# Patient Record
Sex: MALE | Race: WHITE | Employment: FULL TIME | URBAN - METROPOLITAN AREA
[De-identification: names, ages, dates, MRNs, and addresses within clinical notes are randomized per-mention and may not be internally consistent; named-entity substitution may affect disease eponyms.]

---

## 2019-05-03 ENCOUNTER — APPOINTMENT (OUTPATIENT)
Dept: GENERAL RADIOLOGY | Age: 54
End: 2019-05-03
Payer: COMMERCIAL

## 2019-05-03 ENCOUNTER — HOSPITAL ENCOUNTER (EMERGENCY)
Age: 54
Discharge: HOME | End: 2019-05-03
Payer: COMMERCIAL

## 2019-05-03 VITALS
DIASTOLIC BLOOD PRESSURE: 85 MMHG | RESPIRATION RATE: 16 BRPM | TEMPERATURE: 98 F | SYSTOLIC BLOOD PRESSURE: 154 MMHG | OXYGEN SATURATION: 100 % | WEIGHT: 170 LBS | HEART RATE: 73 BPM

## 2019-05-03 DIAGNOSIS — S39.012A STRAIN OF LUMBAR REGION, INITIAL ENCOUNTER: Primary | ICD-10-CM

## 2019-05-03 PROCEDURE — 72110 X-RAY EXAM L-2 SPINE 4/>VWS: CPT

## 2019-05-03 PROCEDURE — 99212 OFFICE O/P EST SF 10 MIN: CPT

## 2019-05-03 RX ORDER — TRAMADOL HYDROCHLORIDE 50 MG/1
50 TABLET ORAL EVERY 6 HOURS PRN
Qty: 12 TABLET | Refills: 0 | Status: SHIPPED | OUTPATIENT
Start: 2019-05-03 | End: 2019-05-06

## 2019-05-03 RX ORDER — NAPROXEN 500 MG/1
500 TABLET ORAL 2 TIMES DAILY
Qty: 14 TABLET | Refills: 0 | Status: SHIPPED | OUTPATIENT
Start: 2019-05-03 | End: 2019-05-10

## 2019-05-03 ASSESSMENT — PAIN DESCRIPTION - DESCRIPTORS: DESCRIPTORS: ACHING

## 2019-05-03 ASSESSMENT — PAIN DESCRIPTION - ORIENTATION: ORIENTATION: LOWER

## 2019-05-03 ASSESSMENT — PAIN SCALES - GENERAL: PAINLEVEL_OUTOF10: 5

## 2019-05-03 ASSESSMENT — PAIN DESCRIPTION - LOCATION: LOCATION: BACK

## 2019-05-03 ASSESSMENT — PAIN DESCRIPTION - PAIN TYPE: TYPE: ACUTE PAIN

## 2019-05-03 NOTE — ED PROVIDER NOTES
Department of Emergency Medicine  [address] [place] [address]  Provider Note  Admit Date/Time: 5/3/2019  8:56 AM  Room: 02/02  MRN: [id number]  Chief Complaint: Back Pain (yesterday morning at work he bent over and his low back popped, its just gotten worse, was not lifting anything at the time)       History of Present Illness   Source of history provided by:  patient. History/Exam Limitations: none. Kadi Singh is a 46 y.o. male who has a past medical history of: History reviewed. No pertinent past medical history. presents to the urgent care center by private car for low back pain. The pain is  over the middle of his back he said this happened at work yesterday he said he was wrapping  plastic around a table that they have to pachage an dwhen he stood up something popped  in his back and since that time he has had pain. He said he was having some pain down his legs yesterday but he has not had  pain down his legs today's denying any fever or saddle anesthesia denying any urinary incontinence denying any numbness tingling or weakness in his legs    ROS    Pertinent positives and negatives are stated within HPI, all other systems reviewed and are negative. History reviewed. No pertinent surgical history. Social History:  reports that he has been smoking cigars. He does not have any smokeless tobacco history on file. Family History: family history is not on file. Allergies: Patient has no known allergies. Physical Exam   Oxygen Saturation Interpretation: Normal.   ED Triage Vitals [05/03/19 0857]   BP Temp Temp Source Pulse Resp SpO2 Height Weight   (!) 154/85 98 °F (36.7 °C) Oral 73 16 100 % -- 170 lb (77.1 kg)       Physical Exam  · Constitutional/General: Alert and oriented x3, well appearing, non toxic in NAD  · HEENT:  NC/NT. · Neck: Supple,  · Respiratory:  Not in respiratory distress  · CV:  Regular rate. Regular rhythm. No murmurs, gallops, or rubs.  2+ distal pulses  · Chest: No chest wall tenderness  · GI:  Abdomen Soft, Non tender, Non distended. · Musculoskeletal: Moves all extremities x 4. Warm and well perfused, no clubbing, cyanosis, or edema. Capillary refill <3 seconds  · Integument: skin warm and dry. No rashes. · Lymphatic: no lymphadenopathy noted  · Neurologic: GCS 15, no focal deficits,  · Psychiatric: Normal Affect    Lab / Imaging Results   (All laboratory and radiology results have been personally reviewed by myself)  Labs:  No results found for this visit on 05/03/19. Imaging: All Radiology results interpreted by Radiologist unless otherwise noted. XR LUMBAR SPINE (MIN 4 VIEWS)   Final Result   Multilevel degenerative disc and degenerative joint   disease. ED Course / Medical Decision Making   Medications - No data to display       Consult(s):   None  2  Procedure(s):       MDM:   With pain over the middle of his low back after something popped when he was at work yesterday. I did obtain an x-ray which was negative for any fractures he does have some arthritic changes. He said he is not able to go back to the full duties of his work since he cannot lift without pain. I did down refer him to corporate care for follow-up I did put him on Naprosyn for pain and if that does not relieve his pain I did order some tramadol to take as needed I did search the PDMP and felt was appropriate to provide him with pain medicine. He was released to return to work with restrictions. And he needs to follow-up with corporate care    Counseling:    I have  spoken with the patient and discussed todays results, in addition to providing specific details for the plan of care and counseling regarding the diagnosis and prognosis. Questions are answered at this time and they are agreeable with the plan. Assessment      1.  Strain of lumbar region, initial encounter      Plan   Discharge to home and advised to contact MD Manisha Mejia NE  [facility] [address]  [phone]      As needed    [address] [facility]  [address] [place] [address],[place] [id number]  [phone]    Schedule an appointment as soon as possible for a visit      Patient condition is good    New Medications     New Prescriptions    NAPROXEN (NAPROSYN) 500 MG TABLET    Take 1 tablet by mouth 2 times daily for 7 days PRN/pain    TRAMADOL (ULTRAM) 50 MG TABLET    Take 1 tablet by mouth every 6 hours as needed for Pain for up to 3 days. Intended supply: 3 days. Take lowest dose possible to manage pain     Electronically signed by WINSTON Arreola CNP   DD: 5/3/19  **This report was transcribed using voice recognition software. Every effort was made to ensure accuracy; however, inadvertent computerized transcription errors may be present.   END OF ED PROVIDER NOTE     WINSTON Arreola CNP  05/03/19 3127

## 2019-06-21 ENCOUNTER — EVALUATION (OUTPATIENT)
Dept: PHYSICAL THERAPY | Age: 54
End: 2019-06-21
Payer: COMMERCIAL

## 2019-06-21 DIAGNOSIS — S39.012A STRAIN OF LUMBAR REGION, INITIAL ENCOUNTER: Primary | ICD-10-CM

## 2019-06-21 PROCEDURE — 97163 PT EVAL HIGH COMPLEX 45 MIN: CPT | Performed by: PHYSICAL THERAPIST

## 2019-06-21 NOTE — PROGRESS NOTES
[address] [facility] OUTPATIENT REHABILITATION  PHYSICAL THERAPY INITIAL EVALUATION         Date:  2019   Patient: Kadi Singh  : 1965  MRN: [id number]  Referring Provider: Bethanie Garduno MD  [address] [place] [address]     Medical Diagnosis:   S39.012A (ICD-10-CM) - Strain of muscle, fascia and tendon of lower back, initial encounter    SUBJECTIVE:     Onset date: 19    Onset: Sudden onset    Mechanism of Injury: bending over at work while pulling a roll of tape    Previous PT: none    Medical Management for Current Problem: medications    Chief complaint: pain    Behavior: condition is getting better    Pain: constant  Current: 3/10     Best: 3/10     Worst:3/10    Symptom Type/Quality: dull, throbbing  Location[de-identified] Back: lumbar region center of spine        Provoking Activities/Positions: sitting > 25 min                 Relieving Activitie/Positions: laying down    Disturbed Sleep: no  Bladder Dysfunction: no  Bowel Dysfunction: no     Imaging results: No results found. Past Medical History:  History reviewed. No pertinent past medical history. History reviewed. No pertinent surgical history. Medications:   Current Outpatient Medications   Medication Sig Dispense Refill    naproxen (NAPROSYN) 500 MG tablet Take 1 tablet by mouth 2 times daily for 7 days PRN/pain 14 tablet 0    ibuprofen (ADVIL;MOTRIN) 800 MG tablet Take 1 tablet by mouth every 8 hours as needed for Pain 30 tablet 0     No current facility-administered medications for this visit. 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 Physical demands include: lifting, carrying, pushing, pulling heavy weighted items.   Status: full time    Exercise regimen: walking, gym workouts/weight training , cycling 2-5 days/week    Hobbies: golfing    Patient Goals: pain relief    Contraindications/Precautions: none    OBJECTIVE:     Observations: normal orthopedic exam, well nourished male    Inspection: normal orthopedic exam         Gait: normal    Functional Strength:   Able to toe walk, heel walk, and squat. Range of Motion:    Trunk:    Flexion:  [x] Normal   [] Limited pain upon coming back into neutral   Extension:  [x] Normal   [] Limited  pain   Right Rotation: [x] Normal   [] Limited    Left Rotation:  [x] Normal   [] Limited    Right Side Bending: [x] Normal   [] Limited    Left Side Bending: [x] Normal   [] Limited     Lower Extremity:   Right:   [x] Normal   [] Limited    Left:   [x] Normal   [] Limited       Strength:     Trunk: 4+/5 pain   R LE: 5/5   L LE: 5/5    Palpation: No tenderness     Sensation: intact to light touch and temperature. Special Tests:   [x] Nerve Root Compression           Right []+ / [x] -    Left []+ / [x] -  [x] Slump           Right []+ / [x] -    Left []+ / [x] -  [] FADIR          Right []+ / [] -    Left []+ / [] -  [] S-I Distraction          Right []+ / [] -    Left []+ / [] -     [x] SLR           Right []+ / [x] -    Left []+ / [x] -     [x] SPENCER          Right []+ / [x] -    Left []+ / [] -  [] S-I Compression          Right []+ / [] -    Left []+ / [] -   [] Other: []+ / [] -       Special Test Comments:     ASSESSMENT     Outcome Measure:   Modified Oswestry 14% disability    Problems:    Pain reported 3/10   ROM painful   Strength decreased   Decreased functional ability with lifting and carrying    [x] There are no barriers affecting plan of care or recovery    [] Barriers to this patient's plan of care or recovery include.     Domestic Concerns:  [x] No  [] Yes:    Short Term goals (6 visits)   Decrease reported pain to 1/10   Increase ROM to WNL    Increase Strength to 5-/5    Able to perform/complete the following functions/tasks: lift and sit    Long Term goals (12 visits)   Decrease reported pain to 0/10   Increase Strength to 5/5    Able to perform/complete the following functions/tasks: lift w/o pain or apprehension  Oswestry Low Back Disability Questionnaire 4% disability    Independent with Home Exercise Programs    Rehab Potential: [x] Good  [] Fair  [] Poor    PLAN       Treatment Plan:   [x] Therapeutic Exercise  [x] Therapeutic Activity  [x] Neuromuscular Re-education   [] Gait Training  [] Balance Training  [] Aerobic conditioning  [] Manual Therapy  [] Massage/Fascial release   [] Work/Sport specific activities    [] Pain Neuroscience [x] Cold/hotpack  [] Vasocompression  [x] Electrical Stimulation  [] Lumbar/Cervical Traction  [] Ultrasound   [] Iontophoresis: 4 mg/mL Dexamethasone Sodium Phosphate 40-80 mAmin        [x] Instruction in HEP      []  Medication allergies reviewed for use of Dexamethasone Sodium Phosphate 4mg/ml  with iontophoresis treatments. Patient is not allergic. The following CPT codes are likely to be used in the care of this patient: 70713 Therapeutic Exercise , 35468 Neuromuscular Re-Education , 52767 Therapeutic Activities ,  Electrical Stimulation      Suggested Professional Referral: [x] No  [] Yes:     Patient Education:  [x] Plans/Goals, Risks/Benefits discussed  [x] Home exercise program  Method of Education: [x] Verbal  [x] Demo  [x] Written  Comprehension of Education:  [x] Verbalizes understanding. [x] Demonstrates understanding. [] Needs Review. [] Demonstrates/verbalizes understanding of HEP/Ed previously given. Frequency:  3 days per week for 4 weeks    Patient understands diagnosis/prognosis and consents to treatment, plan and goals: [x] Yes    [] No     Thank you for the opportunity to work with your patient. If you have questions or comments, please contact me at [phone]; fax: [phone]. Electronically signed by: Ludmila Souza PT         Please sign Physician's Certification and return to:   [facility] [address]  Dept: [phone]  Dept Fax: [phone] Certification / Comments     Frequency/Duration 3 days per week for 4 weeks. Certification period from 6/21/2019  to 9-10-19. I have reviewed the Plan of Care established for skilled therapy services and certify that the services are required and that they will be provided while the patient is under my care.     Physician's Comments/Revisions:               Physician's Printed Name:                                           [de-identified] Signature:                                                               Date:

## 2019-06-27 ENCOUNTER — TREATMENT (OUTPATIENT)
Dept: PHYSICAL THERAPY | Age: 54
End: 2019-06-27
Payer: COMMERCIAL

## 2019-06-27 DIAGNOSIS — S39.012A STRAIN OF LUMBAR REGION, INITIAL ENCOUNTER: Primary | ICD-10-CM

## 2019-06-27 PROCEDURE — 97110 THERAPEUTIC EXERCISES: CPT

## 2019-06-27 NOTE — PROGRESS NOTES
Physical Therapy Treatment Note    Date: 2019  Patient Name: Petra Hyde  : 1965   MRN: [id number]  DOInjury: 19  DOSx: none  Referring Provider:  Allen Schmidt MD     Medical Diagnosis: A69.625R (ICD-10-CM) - Strain of muscle, fascia and tendon of lower back, initial encounter  Outcome Measure:  Oswestry 14%    S: feeling pretty good  O:  Time 1965-7854     Visit 3 Repeat outcome measure at mid point and end. Pain 3/10     ROM      Modalities      MH + ES            Exercise      ALL EXERCISE DONE WITH DRAW-IN TECHNIQUE                            Functional activities To aid in reaching , pushing, pulling tasks at home     ROWS: H Blue 2 x 20     ROWS: M Blue 2 x 20     ROWS: L Blue 2 x 20     Obliques - high next Xx\"    Obliques - low next Xx\"    Tubing pushes Blue 2 x 20 xx    Nustep   L5/10 min xx    Punches      Lat pulldowns      Triceps ext standing      Marching            Trunk ext TB      Trunk flex TB      Hip abd      Hip EXT      TG Squats                  A:  Tolerated well. Above added to written HEP.  Gave pt blue tubing  P: Continue with rehab plan  Livia Moran PTA    Treatment Charges: Mins Units   Initial Evaluation     Re-Evaluation     Ther Exercise         TE 30 2   Manual Therapy     MT     Ther Activities        TA     Gait Training          GT     Neuro Re-education NR     Modalities     Non-Billable Service Time     Other     Total Time/Units 30 2

## 2025-04-15 DIAGNOSIS — M25.561 ACUTE PAIN OF RIGHT KNEE: Primary | ICD-10-CM

## 2025-04-22 ENCOUNTER — OFFICE VISIT (OUTPATIENT)
Dept: ORTHOPEDIC SURGERY | Age: 60
End: 2025-04-22
Payer: COMMERCIAL

## 2025-04-22 VITALS — BODY MASS INDEX: 23.62 KG/M2 | WEIGHT: 165 LBS | HEIGHT: 70 IN

## 2025-04-22 DIAGNOSIS — M77.51 TENDONITIS OF ANKLE, RIGHT: ICD-10-CM

## 2025-04-22 DIAGNOSIS — M17.11 PRIMARY OSTEOARTHRITIS OF RIGHT KNEE: Primary | ICD-10-CM

## 2025-04-22 PROCEDURE — 4004F PT TOBACCO SCREEN RCVD TLK: CPT | Performed by: ORTHOPAEDIC SURGERY

## 2025-04-22 PROCEDURE — 3017F COLORECTAL CA SCREEN DOC REV: CPT | Performed by: ORTHOPAEDIC SURGERY

## 2025-04-22 PROCEDURE — 99213 OFFICE O/P EST LOW 20 MIN: CPT | Performed by: ORTHOPAEDIC SURGERY

## 2025-04-22 PROCEDURE — G8420 CALC BMI NORM PARAMETERS: HCPCS | Performed by: ORTHOPAEDIC SURGERY

## 2025-04-22 PROCEDURE — G8427 DOCREV CUR MEDS BY ELIG CLIN: HCPCS | Performed by: ORTHOPAEDIC SURGERY

## 2025-04-22 RX ORDER — AMLODIPINE BESYLATE 2.5 MG/1
TABLET ORAL
COMMUNITY
Start: 2025-02-24

## 2025-04-22 RX ORDER — METHYLPREDNISOLONE 4 MG/1
TABLET ORAL
Qty: 1 KIT | Refills: 0 | Status: SHIPPED | OUTPATIENT
Start: 2025-04-22 | End: 2025-04-28

## 2025-04-22 RX ORDER — PRAVASTATIN SODIUM 40 MG
TABLET ORAL
COMMUNITY
Start: 2024-07-09

## 2025-04-22 RX ORDER — MELOXICAM 15 MG/1
15 TABLET ORAL DAILY
Qty: 90 TABLET | Refills: 1 | Status: SHIPPED | OUTPATIENT
Start: 2025-04-22

## 2025-04-22 RX ORDER — MELOXICAM 15 MG/1
15 TABLET ORAL DAILY
COMMUNITY

## 2025-04-22 NOTE — PROGRESS NOTES
Chief Complaint   Patient presents with    Knee Pain     Right Knee x 2 months, woke up one morning and was not able to bear any weight, seen PCP and was given NSAID's  No previous right knee surgery.         Subjective:     Patient ID: Edward Patel is a 59 y.o..  male    Knee Pain  Patient complains of right knee pain. This is evaluated as a personal injury. There was not a history of injury.  The pain began 2 months ago. He stated he woke up and had trouble weight bearing. The pain is located medial. He describes  His symptoms as aching. He has experienced popping, clicking, locking, and giving way in the affected knee.  The patient has had pain with kneeling, squating, and climbing stairs.  Symptoms improve with rest. The symptoms are worse with activity, stair climbing, kneeling. The knee has not given out or felt unstable. The patient can bend and straighten the knee fully.  The patient is active in none. Treatment to date has been ice, NSAID's, without significant relief. The patient is working. He also has been complaining of pain over the bottom of the right shin to the ankle that started a few weeks ago.    Past Medical History:   Diagnosis Date    Hypercholesteremia     Hypertension      No past surgical history on file.    Current Outpatient Medications:     pravastatin (PRAVACHOL) 40 MG tablet, Take by mouth, Disp: , Rfl:     meloxicam (MOBIC) 15 MG tablet, Take 1 tablet by mouth daily, Disp: , Rfl:     amLODIPine (NORVASC) 2.5 MG tablet, TAKE ONE TABLET BY MOUTH EVERY DAY. DUE FOR AN APPOINTMENT, Disp: , Rfl:   No Known Allergies  Social History     Socioeconomic History    Marital status:      Spouse name: Not on file    Number of children: Not on file    Years of education: Not on file    Highest education level: Not on file   Occupational History    Not on file   Tobacco Use    Smoking status: Light Smoker     Types: Cigars    Smokeless tobacco: Not on file   Substance and Sexual

## 2025-05-13 ENCOUNTER — OFFICE VISIT (OUTPATIENT)
Dept: ORTHOPEDIC SURGERY | Age: 60
End: 2025-05-13

## 2025-05-13 VITALS — WEIGHT: 165 LBS | BODY MASS INDEX: 23.62 KG/M2 | HEIGHT: 70 IN

## 2025-05-13 DIAGNOSIS — M17.12 PRIMARY OSTEOARTHRITIS OF LEFT KNEE: ICD-10-CM

## 2025-05-13 DIAGNOSIS — M17.11 PRIMARY OSTEOARTHRITIS OF RIGHT KNEE: Primary | ICD-10-CM

## 2025-05-13 RX ORDER — TRIAMCINOLONE ACETONIDE 40 MG/ML
40 INJECTION, SUSPENSION INTRA-ARTICULAR; INTRAMUSCULAR ONCE
Status: COMPLETED | OUTPATIENT
Start: 2025-05-13 | End: 2025-05-13

## 2025-05-13 RX ADMIN — TRIAMCINOLONE ACETONIDE 40 MG: 40 INJECTION, SUSPENSION INTRA-ARTICULAR; INTRAMUSCULAR at 16:07

## 2025-05-13 NOTE — PROGRESS NOTES
Chief Complaint   Patient presents with    Knee Pain     Patient is presenting today for b/l knee pain. Pain is described mostly as a dull ache but sometimes is sharp especially in the right knee. Pain worse after a lot of activity.        Subjective:     Patient ID: Edward Patel is a 59 y.o..  male    Knee Pain  Patient presented for follow up b/l knee pain R>L. He stated he has trouble with his knees after ambulating for long distances and increased exercise. He takes OTC medication as needed.     Past Medical History:   Diagnosis Date    Hypercholesteremia     Hypertension      No past surgical history on file.    Current Outpatient Medications:     pravastatin (PRAVACHOL) 40 MG tablet, Take by mouth, Disp: , Rfl:     meloxicam (MOBIC) 15 MG tablet, Take 1 tablet by mouth daily, Disp: , Rfl:     amLODIPine (NORVASC) 2.5 MG tablet, TAKE ONE TABLET BY MOUTH EVERY DAY. DUE FOR AN APPOINTMENT, Disp: , Rfl:     meloxicam (MOBIC) 15 MG tablet, Take 1 tablet by mouth daily, Disp: 90 tablet, Rfl: 1  No Known Allergies  Social History     Socioeconomic History    Marital status:      Spouse name: Not on file    Number of children: Not on file    Years of education: Not on file    Highest education level: Not on file   Occupational History    Not on file   Tobacco Use    Smoking status: Light Smoker     Types: Cigars    Smokeless tobacco: Not on file   Substance and Sexual Activity    Alcohol use: Not on file    Drug use: Not on file    Sexual activity: Not on file   Other Topics Concern    Not on file   Social History Narrative    Not on file     Social Drivers of Health     Financial Resource Strain: Not on file   Food Insecurity: Not on file   Transportation Needs: Not on file   Physical Activity: Not on file   Stress: Not on file   Social Connections: Not on file   Intimate Partner Violence: Not on file   Housing Stability: Not on file     No family history on file.      REVIEW OF SYSTEMS:

## 2025-06-26 ENCOUNTER — TELEPHONE (OUTPATIENT)
Dept: ORTHOPEDIC SURGERY | Age: 60
End: 2025-06-26

## 2025-08-18 ENCOUNTER — OFFICE VISIT (OUTPATIENT)
Dept: ORTHOPEDIC SURGERY | Age: 60
End: 2025-08-18

## 2025-08-18 VITALS — WEIGHT: 165 LBS | HEIGHT: 70 IN | BODY MASS INDEX: 23.62 KG/M2

## 2025-08-18 DIAGNOSIS — M17.12 PRIMARY OSTEOARTHRITIS OF LEFT KNEE: ICD-10-CM

## 2025-08-18 DIAGNOSIS — M17.11 PRIMARY OSTEOARTHRITIS OF RIGHT KNEE: Primary | ICD-10-CM

## 2025-08-20 RX ORDER — TRIAMCINOLONE ACETONIDE 40 MG/ML
40 INJECTION, SUSPENSION INTRA-ARTICULAR; INTRAMUSCULAR ONCE
Status: COMPLETED | OUTPATIENT
Start: 2025-08-20 | End: 2025-08-20

## 2025-08-20 RX ADMIN — TRIAMCINOLONE ACETONIDE 40 MG: 40 INJECTION, SUSPENSION INTRA-ARTICULAR; INTRAMUSCULAR at 11:57
